# Patient Record
Sex: MALE
[De-identification: names, ages, dates, MRNs, and addresses within clinical notes are randomized per-mention and may not be internally consistent; named-entity substitution may affect disease eponyms.]

---

## 2019-12-18 ENCOUNTER — HOSPITAL ENCOUNTER (INPATIENT)
Dept: HOSPITAL 5 - ED | Age: 29
LOS: 1 days | Discharge: HOME | DRG: 871 | End: 2019-12-19
Attending: INTERNAL MEDICINE | Admitting: INTERNAL MEDICINE
Payer: COMMERCIAL

## 2019-12-18 DIAGNOSIS — J69.0: ICD-10-CM

## 2019-12-18 DIAGNOSIS — E87.1: ICD-10-CM

## 2019-12-18 DIAGNOSIS — Z71.6: ICD-10-CM

## 2019-12-18 DIAGNOSIS — R73.9: ICD-10-CM

## 2019-12-18 DIAGNOSIS — A41.9: Primary | ICD-10-CM

## 2019-12-18 DIAGNOSIS — E87.6: ICD-10-CM

## 2019-12-18 DIAGNOSIS — J96.01: ICD-10-CM

## 2019-12-18 DIAGNOSIS — F17.200: ICD-10-CM

## 2019-12-18 LAB
ALBUMIN SERPL-MCNC: 4.1 G/DL (ref 3.9–5)
ALT SERPL-CCNC: 60 UNITS/L (ref 7–56)
BASOPHILS # (AUTO): 0 K/MM3 (ref 0–0.1)
BASOPHILS NFR BLD AUTO: 0.2 % (ref 0–1.8)
BILIRUB UR QL STRIP: (no result)
BLOOD UR QL VISUAL: (no result)
BUN SERPL-MCNC: 16 MG/DL (ref 9–20)
BUN/CREAT SERPL: 18 %
CALCIUM SERPL-MCNC: 8.8 MG/DL (ref 8.4–10.2)
EOSINOPHIL # BLD AUTO: 0 K/MM3 (ref 0–0.4)
EOSINOPHIL NFR BLD AUTO: 0 % (ref 0–4.3)
HCT VFR BLD CALC: 41.4 % (ref 35.5–45.6)
HEMOLYSIS INDEX: 30
HGB BLD-MCNC: 14 GM/DL (ref 11.8–15.2)
HYALINE CASTS #/AREA URNS LPF: 1 /LPF
INR PPP: 1.02 (ref 0.87–1.13)
LYMPHOCYTES # BLD AUTO: 0.9 K/MM3 (ref 1.2–5.4)
LYMPHOCYTES NFR BLD AUTO: 9.1 % (ref 13.4–35)
MCHC RBC AUTO-ENTMCNC: 34 % (ref 32–34)
MCV RBC AUTO: 83 FL (ref 84–94)
MONOCYTES # (AUTO): 0.7 K/MM3 (ref 0–0.8)
MONOCYTES % (AUTO): 7.5 % (ref 0–7.3)
MUCOUS THREADS #/AREA URNS HPF: (no result) /HPF
PH UR STRIP: 6 [PH] (ref 5–7)
PLATELET # BLD: 125 K/MM3 (ref 140–440)
RBC # BLD AUTO: 4.97 M/MM3 (ref 3.65–5.03)
RBC #/AREA URNS HPF: 3 /HPF (ref 0–6)
UROBILINOGEN UR-MCNC: < 2 MG/DL (ref ?–2)
WBC #/AREA URNS HPF: 4 /HPF (ref 0–6)

## 2019-12-18 PROCEDURE — 87040 BLOOD CULTURE FOR BACTERIA: CPT

## 2019-12-18 PROCEDURE — 93010 ELECTROCARDIOGRAM REPORT: CPT

## 2019-12-18 PROCEDURE — 85025 COMPLETE CBC W/AUTO DIFF WBC: CPT

## 2019-12-18 PROCEDURE — 82805 BLOOD GASES W/O2 SATURATION: CPT

## 2019-12-18 PROCEDURE — 83036 HEMOGLOBIN GLYCOSYLATED A1C: CPT

## 2019-12-18 PROCEDURE — 36415 COLL VENOUS BLD VENIPUNCTURE: CPT

## 2019-12-18 PROCEDURE — 85027 COMPLETE CBC AUTOMATED: CPT

## 2019-12-18 PROCEDURE — 84145 PROCALCITONIN (PCT): CPT

## 2019-12-18 PROCEDURE — 4A033R1 MEASUREMENT OF ARTERIAL SATURATION, PERIPHERAL, PERCUTANEOUS APPROACH: ICD-10-PCS | Performed by: INTERNAL MEDICINE

## 2019-12-18 PROCEDURE — 82803 BLOOD GASES ANY COMBINATION: CPT

## 2019-12-18 PROCEDURE — 80074 ACUTE HEPATITIS PANEL: CPT

## 2019-12-18 PROCEDURE — 81001 URINALYSIS AUTO W/SCOPE: CPT

## 2019-12-18 PROCEDURE — 87400 INFLUENZA A/B EACH AG IA: CPT

## 2019-12-18 PROCEDURE — 82140 ASSAY OF AMMONIA: CPT

## 2019-12-18 PROCEDURE — 87086 URINE CULTURE/COLONY COUNT: CPT

## 2019-12-18 PROCEDURE — 71045 X-RAY EXAM CHEST 1 VIEW: CPT

## 2019-12-18 PROCEDURE — 71250 CT THORAX DX C-: CPT

## 2019-12-18 PROCEDURE — 85610 PROTHROMBIN TIME: CPT

## 2019-12-18 PROCEDURE — 80053 COMPREHEN METABOLIC PANEL: CPT

## 2019-12-18 PROCEDURE — 83735 ASSAY OF MAGNESIUM: CPT

## 2019-12-18 PROCEDURE — 93005 ELECTROCARDIOGRAM TRACING: CPT

## 2019-12-18 PROCEDURE — 80048 BASIC METABOLIC PNL TOTAL CA: CPT

## 2019-12-18 RX ADMIN — OSELTAMIVIR PHOSPHATE SCH MG: 75 CAPSULE ORAL at 22:30

## 2019-12-18 RX ADMIN — BENZONATATE SCH MG: 100 CAPSULE ORAL at 22:30

## 2019-12-18 RX ADMIN — SODIUM CHLORIDE SCH MLS/HR: 0.9 INJECTION, SOLUTION INTRAVENOUS at 18:05

## 2019-12-18 NOTE — HISTORY AND PHYSICAL REPORT
History of Present Illness


Date of admission: 


12/18/19 10:20





Chief complaint: 


Body aches


History of present illness: 


Patient is a 27 yo man with history of tobacco dependency who presents with 

severe worsening constant radiating body aches, subjective fevers, chills, 

yellow whitish productive cough and sore throat that begun on Saturday. He 

denies any aggravating or relieving factors. He decided to come to hospital 

because he was so exhausted and weak. His 11 month old baby girl was diagnosis 

with Influenza A. His Influenza was negative. He is being admitted to the 

hospital because his pO2 on ABG was only 58 on room air. 





PMH: as hpi


PSH: he denies


SH: +tob, no etoh/drug abuse


FH: he denies





ROS: 


Constitutional: + fever 


ENT: + throat or neck pain 


Respiratory: +cough, no shortness of breath 


Cardiovascular: denies: chest pain 


Endocrine: denies unexplained weight loss or gain 


Gastrointestinal: denies: abdominal pain, nausea +diarrhea


Genitourinary: denies: dysuria 


Rectal: denies no incontinence, no bleeding, no itching, no discharge 


Musculoskeletal: denies swelling, +myaglia, muscle weakness 


Skin: denies: rash 


Neurological: + headache 


Hematological/Lymphatic: denies: easy bleeding or easy bruising 


Allergic/Immunologic: no urticaria, no allergic rhinitis, no anaphylaxis 


Psych: denies sadness or hopelessness, SI/HI 





Medications and Allergies


                                    Allergies











Allergy/AdvReac Type Severity Reaction Status Date / Time


 


No Known Allergies Allergy   Unverified 12/18/19 05:47











                                Home Medications











 Medication  Instructions  Recorded  Confirmed  Last Taken  Type


 


No Known Home Medications [No  12/18/19 12/18/19 Unknown History





Reported Home Medications]     














Exam





- Physical Exam


Narrative exam: 


Gen: WDWN, ill appearing, mild increase accessory muscles, Awake, Alert, 

Orientated 


HEENT: NCAT, EOMI, PERRL, OP Clear 


Neck: supple, no adenopathy, no thyromegaly, no JVD 


CVS/Heart: Regular tachycardia normal S1S2, pulses present bilaterally 


Chest/Lungs: tachypneic, coarse bs, Symmetrical chest expansion, good air entry 

bilaterally 


GI/Abdomen: soft, NTND, good bowel sounds, no guarding or rebound 


/Bladder: no suprapubic tenderness, no CVA or paraspinal tenderness 


Extermity/Skin: no c/c/e, no obvious rash 


MSK: FROM x 4 


Neuro: CN 2-12 grossly intact, no new focal deficits 


Psych: calm 





- Constitutional


Vitals: 


                                        











Temp Pulse Resp BP Pulse Ox


 


 99.6 F   91 H  26 H  107/68   94 


 


 12/18/19 11:52  12/18/19 12:57  12/18/19 11:52  12/18/19 11:52  12/18/19 11:52














Results





- Labs


CBC & Chem 7: 


                                 12/18/19 05:57





                                 12/18/19 05:57


Labs: 


                             Laboratory Last Values











WBC  10.0 K/mm3 (4.5-11.0)   12/18/19  05:57    


 


RBC  4.97 M/mm3 (3.65-5.03)   12/18/19  05:57    


 


Hgb  14.0 gm/dl (11.8-15.2)   12/18/19  05:57    


 


Hct  41.4 % (35.5-45.6)   12/18/19  05:57    


 


MCV  83 fl (84-94)  L  12/18/19  05:57    


 


MCH  28 pg (28-32)   12/18/19  05:57    


 


MCHC  34 % (32-34)   12/18/19  05:57    


 


RDW  14.4 % (13.2-15.2)   12/18/19  05:57    


 


Plt Count  125 K/mm3 (140-440)  L  12/18/19  05:57    


 


Lymph % (Auto)  9.1 % (13.4-35.0)  L  12/18/19  05:57    


 


Mono % (Auto)  7.5 % (0.0-7.3)  H  12/18/19  05:57    


 


Eos % (Auto)  0.0 % (0.0-4.3)   12/18/19  05:57    


 


Baso % (Auto)  0.2 % (0.0-1.8)   12/18/19  05:57    


 


Lymph #  0.9 K/mm3 (1.2-5.4)  L  12/18/19  05:57    


 


Mono #  0.7 K/mm3 (0.0-0.8)   12/18/19  05:57    


 


Eos #  0.0 K/mm3 (0.0-0.4)   12/18/19  05:57    


 


Baso #  0.0 K/mm3 (0.0-0.1)   12/18/19  05:57    


 


Seg Neutrophils %  83.2 % (40.0-70.0)  H  12/18/19  05:57    


 


Seg Neutrophils #  8.4 K/mm3 (1.8-7.7)  H  12/18/19  05:57    


 


PT  13.5 Sec. (12.2-14.9)   12/18/19  05:57    


 


INR  1.02  (0.87-1.13)   12/18/19  05:57    


 


POC ABG pH  7.448  (7.35-7.45)   12/18/19  08:52    


 


POC ABG pCO2  32.2  (35-45)  L  12/18/19  08:52    


 


POC ABG pO2  58  ()  L  12/18/19  08:52    


 


POC ABG HCO3  22.3  (22-26 mml/L)   12/18/19  08:52    


 


POC ABG Total CO2  23  (23-27mmol/L)   12/18/19  08:52    


 


POC ABG O2 Sat  91   12/18/19  08:52    


 


POC ABG Base Excess  -2  ((-2) - (+3)mmol/L)   12/18/19  08:52    


 


VBG pH  7.425  (7.320-7.420)  H  12/18/19  05:57    


 


FiO2  21 %  12/18/19  08:52    


 


Sodium  136 mmol/L (137-145)  L  12/18/19  05:57    


 


Potassium  3.0 mmol/L (3.6-5.0)  L  12/18/19  05:57    


 


Chloride  101.1 mmol/L ()   12/18/19  05:57    


 


Carbon Dioxide  19 mmol/L (22-30)  L  12/18/19  05:57    


 


Anion Gap  19 mmol/L  12/18/19  05:57    


 


BUN  16 mg/dL (9-20)   12/18/19  05:57    


 


Creatinine  0.9 mg/dL (0.8-1.5)   12/18/19  05:57    


 


Estimated GFR  > 60 ml/min  12/18/19  05:57    


 


BUN/Creatinine Ratio  18 %  12/18/19  05:57    


 


Glucose  149 mg/dL ()  H  12/18/19  05:57    


 


Lactic Acid  1.50 mmol/L (0.7-2.0)   12/18/19  08:29    


 


Calcium  8.8 mg/dL (8.4-10.2)   12/18/19  05:57    


 


Total Bilirubin  0.30 mg/dL (0.1-1.2)   12/18/19  05:57    


 


AST  42 units/L (5-40)  H  12/18/19  05:57    


 


ALT  60 units/L (7-56)  H  12/18/19  05:57    


 


Alkaline Phosphatase  116 units/L ()   12/18/19  05:57    


 


Total Protein  7.8 g/dL (6.3-8.2)   12/18/19  05:57    


 


Albumin  4.1 g/dL (3.9-5)   12/18/19  05:57    


 


Albumin/Globulin Ratio  1.1 %  12/18/19  05:57    


 


Urine Color  Yellow  (Yellow)   12/18/19  07:58    


 


Urine Turbidity  Clear  (Clear)   12/18/19  07:58    


 


Urine pH  6.0  (5.0-7.0)   12/18/19  07:58    


 


Ur Specific Gravity  1.028  (1.003-1.030)   12/18/19  07:58    


 


Urine Protein  30 mg/dl mg/dL (Negative)   12/18/19  07:58    


 


Urine Glucose (UA)  Neg mg/dL (Negative)   12/18/19  07:58    


 


Urine Ketones  20 mg/dL (Negative)   12/18/19  07:58    


 


Urine Blood  Neg  (Negative)   12/18/19  07:58    


 


Urine Nitrite  Neg  (Negative)   12/18/19  07:58    


 


Urine Bilirubin  Neg  (Negative)   12/18/19  07:58    


 


Urine Urobilinogen  < 2.0 mg/dL (<2.0)   12/18/19  07:58    


 


Ur Leukocyte Esterase  Neg  (Negative)   12/18/19  07:58    


 


Urine WBC (Auto)  4.0 /HPF (0.0-6.0)   12/18/19  07:58    


 


Urine RBC (Auto)  3.0 /HPF (0.0-6.0)   12/18/19  07:58    


 


U Epithel Cells (Auto)  < 1.0 /HPF (0-13.0)   12/18/19  07:58    


 


Hyaline Casts  1 /LPF  12/18/19  07:58    


 


Urine Mucus  1+ /HPF  12/18/19  07:58    


 


Influenza A (Rapid)  Negative  (Negative)   12/18/19  05:54    


 


Influenza B (Rapid)  Negative  (Negative)   12/18/19  05:54    














Assessment and Plan


Assessment and plan: 


Patient is a 27 yo man with history of tobacco dependency who presents with 

severe worsening constant radiating body aches, subjective fevers, chills, 

yellow whitish productive cough and sore throat that begun on Saturday. He 

denies any aggravating or relieving factors. He decided to come to hospital 

because he was so exhausted and weak. His 11 month old baby girl was diagnosis 

with Influenza A. His Influenza was negative. He is being admitted to the 

hospital because his pO2 on ABG was only 58 on room air. 





Acute hypoxic respiratory failure most likely due to Pneumonia: treat with 

supplemental O2


Suspect early pre-radiographic Pneumonia: treat with IV abx


Sepsis Pneumonia: order blood cultures, treat with IV abx


Hypokalemia: replete, recheck and check mag level


Hyponatremia, hypovolemia: treat with IVFs


Elevated AST/ALT, mild: check hepatitis panel, he can follow up outpatient for 

any imaging


Hyperglycemia, mild: check A1c


DVT ppx: sq lovenox

## 2019-12-18 NOTE — CAT SCAN REPORT
CT CHEST WITHOUT CONTRAST



INDICATION: fevers



CONTRAST: Without IV



COMPARISON: Portable chest x-ray this morning



All CT scans at this location are performed using CT dose reduction for ALARA by means of automated e
xposure control. 



FINDINGS: No significant axillary or chest wall abnormalities are seen. No mediastinal or hilar latrell
s are noted. Minimal bilateral pleural effusions are seen. Visualized portions of the upper abdomen s
how no significant abnormalities. No pneumothorax or pneumomediastinum are seen. No obvious endobronc
hial lesions are noted.



Motion artifact is seen in the lung fields making evaluation more difficult. In the upper portion of 
the lingula and ovoid density is seen which appears semisolid and measures 11 mm in length and 7 mm i
n transverse diameter. Possibly this is a nodule though may be slight patchy infiltrate. Possible teresa
undglass type nodule is seen peripherally in the lingula measuring 8 mm though again this may be slig
ht patchy infiltrate rather than a true nodule. There is some patchy infiltrate in the lower lobes bi
laterally, more on the left, which likely represents pneumonitis. No extensive dense areas of consoli
dation are seen.





IMPRESSION: 

1. Patchy infiltrate is seen in the lower lobes bilaterally, more on the left, which likely represent
s patchy acute pneumonitis.

2. What is probably similar but more isolated change is seen in the lingula. I cannot fully exclude t
he possibility of groundglass type semisolid nodularity but believe in this scenario patchy infiltrat
e is more likely. I would suggest follow-up however.



Signer Name: Ernesto Perea MD 

Signed: 12/18/2019 7:16 PM

 Workstation Name: [id number]

## 2019-12-18 NOTE — XRAY REPORT
CHEST 1 VIEW 



INDICATION / CLINICAL INFORMATION:

possible Sepsis. 



COMPARISON: 

None available.



FINDINGS:



SUPPORT DEVICES: None.

HEART / MEDIASTINUM: No significant abnormality. 

LUNGS / PLEURA: No significant pulmonary or pleural abnormality. No pneumothorax. 



ADDITIONAL FINDINGS: No significant additional findings.



IMPRESSION:

1. No significant change



Signer Name: Francisco Lorenz MD 

Signed: 12/18/2019 6:14 AM

 Workstation Name: [id number]

## 2019-12-18 NOTE — EMERGENCY DEPARTMENT REPORT
HPI





- General


Chief Complaint: Upper Respiratory Infection


Time Seen by Provider: 19 06:35





- HPI


HPI: 





Room 19








The patient is a 28-year-old male presenting with a chief complaint of body 

aches and fever.  Patient's symptoms began 2019 and they include chills, 

bodyaches sore throat cough.  Patient states she's had a cough productive of 

yellow sputum in addition to rhinorrhea.  Patient also complains of headache and

body aches.  There are sick contacts at home with one child diagnosed with 

influenza.  The patient gets his pain a score of 8/10.








Location: [See above]


Duration: [See above]


Quality: [See above]


Severity: [See above]


Timing: [See above]


Context: [See above]


Modifying factors: [See above]


Associated signs and symptoms: [see above]








ED Past Medical Hx





- Past Medical History


Previous Medical History?: No





- Surgical History


Past Surgical History?: No





- Family History


Family history: no significant





- Social History


Smoking Status: Current Some Day Smoker (occasional)


Substance Use Type: None (denies illicit drug use)





- Medications


Home Medications: 


                                Home Medications











 Medication  Instructions  Recorded  Confirmed  Last Taken  Type


 


No Known Home Medications [No  19 Unknown History





Reported Home Medications]     














ED Review of Systems


ROS: 


Stated complaint: WEAKNESS/COUGH


Other details as noted in HPI





Constitutional: chills, fever


Eyes: denies: eye pain


ENT: congestion


Respiratory: cough, shortness of breath


Cardiovascular: denies: chest pain


Endocrine: no symptoms reported


Gastrointestinal: nausea, vomiting, diarrhea


Genitourinary: denies: dysuria


Musculoskeletal: myalgia


Neurological: headache





Physical Exam





- Physical Exam


Vital Signs: 


                                   Vital Signs











  19





  05:47 06:00 06:16


 


Temperature 103 F H  


 


Pulse Rate 117 H 59 L 93 H


 


Respiratory 20 26 H 20





Rate   


 


Blood Pressure 122/71 128/71 121/67


 


O2 Sat by Pulse 88  94





Oximetry   











Physical Exam: 





GENERAL: The patient is well-developed well-nourished male lying on stretcher.  

Be in moderate discomfort. []


HEENT: Normocephalic.  Atraumatic.  Extraocular motions are intact.  Patient has

 moist mucous membranes.


NECK: Supple.  No meningitic signs are noted.  There is no nuchal rigidity


CHEST/LUNGS: Clear to auscultation.  There is no respiratory distress noted.


HEART/CARDIOVASCULAR: Regular.  There is no tachycardia.  There is no gallop rub

 or murmur.


ABDOMEN: Abdomen is soft, nontender.  Patient has normal bowel sounds.  There is

 no abdominal distention.


SKIN: There is no rash.  There is no edema.  There is no diaphoresis.


NEURO: The patient is awake, alert, and oriented.  The patient is cooperative.  

The patient has no focal neurologic deficits.  The patient has normal speech


MUSCULOSKELETAL:  There is no evidence of acute injury.





ED Course


                                   Vital Signs











  19





  05:47 06:00 06:16


 


Temperature 103 F H  


 


Pulse Rate 117 H 59 L 93 H


 


Respiratory 20 26 H 20





Rate   


 


Blood Pressure 122/71 128/71 121/67


 


O2 Sat by Pulse 88  94





Oximetry   














ED Medical Decision Making





- Lab Data


Result diagrams: 


                                 19 05:57





                                 19 05:57





                                Laboratory Tests











  19





  05:54 05:57 05:57


 


WBC   10.0 


 


RBC   4.97 


 


Hgb   14.0 


 


Hct   41.4 


 


MCV   83 L 


 


MCH   28 


 


MCHC   34 


 


RDW   14.4 


 


Plt Count   125 L 


 


Lymph % (Auto)   9.1 L 


 


Mono % (Auto)   7.5 H 


 


Eos % (Auto)   0.0 


 


Baso % (Auto)   0.2 


 


Lymph #   0.9 L 


 


Mono #   0.7 


 


Eos #   0.0 


 


Baso #   0.0 


 


Seg Neutrophils %   83.2 H 


 


Seg Neutrophils #   8.4 H 


 


PT    13.5


 


INR    1.02


 


VBG pH   


 


Sodium   


 


Potassium   


 


Chloride   


 


Carbon Dioxide   


 


Anion Gap   


 


BUN   


 


Creatinine   


 


Estimated GFR   


 


BUN/Creatinine Ratio   


 


Glucose   


 


Lactic Acid   


 


Calcium   


 


Total Bilirubin   


 


AST   


 


ALT   


 


Alkaline Phosphatase   


 


Total Protein   


 


Albumin   


 


Albumin/Globulin Ratio   


 


Urine Color   


 


Urine Turbidity   


 


Urine pH   


 


Ur Specific Gravity   


 


Urine Protein   


 


Urine Glucose (UA)   


 


Urine Ketones   


 


Urine Blood   


 


Urine Nitrite   


 


Urine Bilirubin   


 


Urine Urobilinogen   


 


Ur Leukocyte Esterase   


 


Urine WBC (Auto)   


 


Urine RBC (Auto)   


 


U Epithel Cells (Auto)   


 


Hyaline Casts   


 


Urine Mucus   


 


Influenza A (Rapid)  Negative  


 


Influenza B (Rapid)  Negative  














  19





  05:57 05:57 05:57


 


WBC   


 


RBC   


 


Hgb   


 


Hct   


 


MCV   


 


MCH   


 


MCHC   


 


RDW   


 


Plt Count   


 


Lymph % (Auto)   


 


Mono % (Auto)   


 


Eos % (Auto)   


 


Baso % (Auto)   


 


Lymph #   


 


Mono #   


 


Eos #   


 


Baso #   


 


Seg Neutrophils %   


 


Seg Neutrophils #   


 


PT   


 


INR   


 


VBG pH    7.425 H


 


Sodium  136 L  


 


Potassium  3.0 L  


 


Chloride  101.1  


 


Carbon Dioxide  19 L  


 


Anion Gap  19  


 


BUN  16  


 


Creatinine  0.9  


 


Estimated GFR  > 60  


 


BUN/Creatinine Ratio  18  


 


Glucose  149 H  


 


Lactic Acid   1.60 


 


Calcium  8.8  


 


Total Bilirubin  0.30  


 


AST  42 H  


 


ALT  60 H  


 


Alkaline Phosphatase  116  


 


Total Protein  7.8  


 


Albumin  4.1  


 


Albumin/Globulin Ratio  1.1  


 


Urine Color   


 


Urine Turbidity   


 


Urine pH   


 


Ur Specific Gravity   


 


Urine Protein   


 


Urine Glucose (UA)   


 


Urine Ketones   


 


Urine Blood   


 


Urine Nitrite   


 


Urine Bilirubin   


 


Urine Urobilinogen   


 


Ur Leukocyte Esterase   


 


Urine WBC (Auto)   


 


Urine RBC (Auto)   


 


U Epithel Cells (Auto)   


 


Hyaline Casts   


 


Urine Mucus   


 


Influenza A (Rapid)   


 


Influenza B (Rapid)   














  19





  07:58


 


WBC 


 


RBC 


 


Hgb 


 


Hct 


 


MCV 


 


MCH 


 


MCHC 


 


RDW 


 


Plt Count 


 


Lymph % (Auto) 


 


Mono % (Auto) 


 


Eos % (Auto) 


 


Baso % (Auto) 


 


Lymph # 


 


Mono # 


 


Eos # 


 


Baso # 


 


Seg Neutrophils % 


 


Seg Neutrophils # 


 


PT 


 


INR 


 


VBG pH 


 


Sodium 


 


Potassium 


 


Chloride 


 


Carbon Dioxide 


 


Anion Gap 


 


BUN 


 


Creatinine 


 


Estimated GFR 


 


BUN/Creatinine Ratio 


 


Glucose 


 


Lactic Acid 


 


Calcium 


 


Total Bilirubin 


 


AST 


 


ALT 


 


Alkaline Phosphatase 


 


Total Protein 


 


Albumin 


 


Albumin/Globulin Ratio 


 


Urine Color  Yellow


 


Urine Turbidity  Clear


 


Urine pH  6.0


 


Ur Specific Gravity  1.028


 


Urine Protein  30 mg/dl


 


Urine Glucose (UA)  Neg


 


Urine Ketones  20


 


Urine Blood  Neg


 


Urine Nitrite  Neg


 


Urine Bilirubin  Neg


 


Urine Urobilinogen  < 2.0


 


Ur Leukocyte Esterase  Neg


 


Urine WBC (Auto)  4.0


 


Urine RBC (Auto)  3.0


 


U Epithel Cells (Auto)  < 1.0


 


Hyaline Casts  1


 


Urine Mucus  1+


 


Influenza A (Rapid) 


 


Influenza B (Rapid) 














- Radiology Data


Radiology results: report reviewed (chest x-ray), image reviewed (chest x-ray)


interpreted by me: 





Chest x-ray-no focal infiltrate, no pneumothorax





[address] [address] 

XRay Report Signed Patient: MYRIAM FOSS MR#: [id number] : 

1990 Acct:[id number] Age/Sex: 28 / M ADM Date: 19 Loc: ED 

Attending Dr: Ordering Physician: ED DOC, MD Date of Service: 19 

Procedure(s): XR chest 1V ap Accession Number(s): [id number] cc: ED DOC, MD Fluoro 

Time In Minutes: CHEST 1 VIEW INDICATION / CLINICAL INFORMATION: possible 

Sepsis. COMPARISON: None available. FINDINGS: SUPPORT DEVICES: None. HEART / 

MEDIASTINUM: No significant abnormality. LUNGS / PLEURA: No significant 

pulmonary or pleural abnormality. No pneumothorax. ADDITIONAL FINDINGS: No 

significant additional findings. IMPRESSION: 1. No significant change Signer 

Name: Francisco Lorenz MD Signed: 2019 6:14 AM Workstation Name: [id number]

 Transcribed By: ROXI Dictated By: Francisco Lorenz MD Electronically 

Authenticated By: Francisco Lorenz MD Signed Date/Time: 19 DD/DT: 

19 TD/TT: 











- Differential Diagnosis


influenza, pneumonia, URI


Critical care attestation.: 


If time is entered above; I have spent that time in minutes in the direct care 

of this critically ill patient, excluding procedure time.








ED Disposition


Clinical Impression: 


 Shortness of breath, Hypoxia, Cough, Body aches, Fever





Disposition: DC-09 OP ADMIT IP TO THIS HOSP


Is pt being admited?: Yes


Does the pt Need Aspirin: No


Condition: Fair


Time of Disposition: 09:03 (hospitalist paged)

## 2019-12-19 VITALS — SYSTOLIC BLOOD PRESSURE: 103 MMHG | DIASTOLIC BLOOD PRESSURE: 66 MMHG

## 2019-12-19 LAB
BUN SERPL-MCNC: 14 MG/DL (ref 9–20)
BUN/CREAT SERPL: 16 %
CALCIUM SERPL-MCNC: 8.5 MG/DL (ref 8.4–10.2)
HCT VFR BLD CALC: 39.5 % (ref 35.5–45.6)
HEMOLYSIS INDEX: 2
HGB BLD-MCNC: 13.3 GM/DL (ref 11.8–15.2)
MCHC RBC AUTO-ENTMCNC: 34 % (ref 32–34)
MCV RBC AUTO: 83 FL (ref 84–94)
PLATELET # BLD: 134 K/MM3 (ref 140–440)
RBC # BLD AUTO: 4.74 M/MM3 (ref 3.65–5.03)

## 2019-12-19 RX ADMIN — OSELTAMIVIR PHOSPHATE SCH MG: 75 CAPSULE ORAL at 10:00

## 2019-12-19 RX ADMIN — BENZONATATE SCH MG: 100 CAPSULE ORAL at 15:42

## 2019-12-19 RX ADMIN — BENZONATATE SCH MG: 100 CAPSULE ORAL at 06:29

## 2019-12-19 RX ADMIN — SODIUM CHLORIDE SCH MLS/HR: 0.9 INJECTION, SOLUTION INTRAVENOUS at 06:29

## 2019-12-19 NOTE — DISCHARGE SUMMARY
Providers





- Providers


Date of Admission: 


12/18/19 10:20





Date of discharge: 12/19/19


Attending physician: 


SAÚL ADAMES





                                        





12/18/19 18:02


Consult to Physician [CONS] Routine 


   Comment: 


   Consulting Provider: JAMAICA HUGHES


   Physician Instructions: 


   Reason For Exam: fevers











Primary care physician: 


PRIMARY CARE MD








Hospitalization


Condition: Stable


Hospital course: 


Patient is a 29 yo man with history of tobacco dependency who presents with 

severe worsening constant radiating body aches, subjective fevers, chills, 

yellow whitish productive cough and sore throat that begun on Saturday. He 

denies any aggravating or relieving factors. He decided to come to hospital 

because he was so exhausted and weak. His 11 month old baby girl was diagnosis 

with Influenza A. His Influenza was negative. He is being admitted to the 

hospital because his pO2 on ABG was only 58 on room air. Although pCXR was 

negative, his lung exam with not normal, so I ordered CT chest which showed 

bilateral infiltrates, suspected pneumonitis. 





Discharge Diagnoses:


Acute hypoxic respiratory failure most likely due to Pneumonia: treat with 

supplemental O2


Bilateral Aspiration Pneumonia/pneumonitis: treat with IV abx, tamiflu


Sepsis Pneumonia: order blood cultures, treat with IV abx


Hypokalemia: replete, recheck and checked mag level normal


Hyponatremia, hypovolemia: treat with IVFs, resolved


Elevated AST/ALT, mild: check hepatitis panel-negative, he can follow up 

outpatient for any imaging


Hyperglycemia, mild: check A1c is 5,8, most likely stress related to illness


Tobacco dependency:  on stopping. 


DVT ppx: sq lovenox





Disposition: DC-01 TO HOME OR SELFCARE


Time spent for discharge: 36 minutes





Core Measure Documentation





- Palliative Care


Palliative Care/ Comfort Measures: Not Applicable





- Core Measures


Any of the following diagnoses?: none





- VTE Discharge Requirements


Deep Vein Thrombosis/Pulmonary Embolism Present on Admission: No


Has pt received <5 days of overlap therapy or INR<2.0: No


Anticoagulant overlap therapy prescribed at discharge: No


Contraindication No Overlap Therapy order at DC: Not Indicated





Exam





- Physical Exam


Narrative exam: 


Gen: WDWN, ill appearing, mild increase accessory muscles, Awake, Alert, 

Orientated 


HEENT: NCAT, EOMI, PERRL, OP Clear 


Neck: supple, no adenopathy, no thyromegaly, no JVD 


CVS/Heart: Regular tachycardia normal S1S2, pulses present bilaterally 


Chest/Lungs: tachypneic, coarse bs, Symmetrical chest expansion, good air entry 

bilaterally 


GI/Abdomen: soft, NTND, good bowel sounds, no guarding or rebound 


/Bladder: no suprapubic tenderness, no CVA or paraspinal tenderness 


Extermity/Skin: no c/c/e, no obvious rash 


MSK: FROM x 4 


Neuro: CN 2-12 grossly intact, no new focal deficits 


Psych: calm 





- Constitutional


Vitals: 


                                        











Temp Pulse Resp BP Pulse Ox


 


 98.2 F   80   18   103/66   95 


 


 12/19/19 16:15  12/19/19 16:15  12/19/19 16:15  12/19/19 16:15  12/19/19 16:15














Plan


Activity: other (no strenous activity until cleared by Infectious Disease 

doctor)


Diet: regular


Special Instructions: smoking cessation


Follow up with: 


PRIMARY CARE,MD [Primary Care Provider] - 3-5 Days


JAMAICA HUGHES MD [Staff Physician] - 7 Days


Prescriptions: 


Azithromycin 500 mg PO QDAY #5 tablet


Oseltamivir [Tamiflu] 75 mg PO BID #8 capsule


Benzonatate [Tessalon Perles] 100 mg PO Q8HR PRN #15 capsule


 PRN Reason: Cough

## 2019-12-19 NOTE — CONSULTATION
History of Present Illness





- Reason for Consult


Consult date: 12/19/19





- History of Present Illness





29 yo M PMHx tobacco abuse admitted to the hospital with complaints of 

generalized myalgias, fevers, chills, and a cough productive of yellow sputum. 

He notes these symptoms began 4 days prior to admission and while some of them 

improved, he began to feel weakness as a result and that prompted his 

presentation to the hospital. In the ER his pO2 was found to be 58 which 

prompted his admission. Of note, his 11 month old daughter was recently 

diagnosed with influenza A. He notes that his SOB is the worst after a severe 

coughing spell, and that at home he had several episodes of vomiting after cough

paroxysms. 





Febrile to 101.2 with a white count of 6.3. Flu is negative. Blood cultures ne

gative, urine cultures negative. Currently receiving azithromycin, ceftriaxone, 

Tamiflu. 





Imaging personally reviewed:


Chest CT - bilateral patchy LL infiltrates - mostly likely acute pneumonitis. 





Review of systems:


 


Bold if positive; otherwise negative


GENERAL: fever, chills, weight loss, fatigue, night sweats


EYES: blurry vision, eye pain


HENT: headache, hearing loss, sore throat, dysphagia, sinus pain


CARDIO: chest pain, palpitations, orthopnea


PULM: shortness of breath, wheezing, cough, sputum, hemoptysis


GI: nausea, vomiting, diarrhea, abdominal pain, blood in stool


: urinary frequency, urgency, dysuria, urethral discharge


MSK: joint pain, back pain, swelling


SKIN: rash, redness


HEME: easy bruising, bleeding








Past History


Past Medical History: other (See HPI)


Past Surgical History: No surgical history


Social history: smoking


Family history: no significant family history





Medications and Allergies


                                    Allergies











Allergy/AdvReac Type Severity Reaction Status Date / Time


 


No Known Allergies Allergy   Unverified 12/18/19 05:47











                                Home Medications











 Medication  Instructions  Recorded  Confirmed  Last Taken  Type


 


No Known Home Medications [No  12/18/19 12/18/19 Unknown History





Reported Home Medications]     











Active Meds: 


Active Medications





Acetaminophen (Tylenol)  650 mg PO Q6H PRN


   PRN Reason: Non Cardiac Pain or Temp>100.5


   Last Admin: 12/18/19 18:05 Dose:  650 mg


   Documented by: 


Benzonatate (Tessalon Perles)  100 mg PO Q8HR [facility]


   Last Admin: 12/19/19 06:29 Dose:  100 mg


   Documented by: 


Enoxaparin Sodium (Enoxaparin)  40 mg SUB-Q QDAY@2200 [facility]


   Last Admin: 12/18/19 22:30 Dose:  40 mg


   Documented by: 


Azithromycin 500 mg/ Sodium (Chloride)  250 mls @ 250 mls/hr IV Q24HR [facility]; 

Protocol


   Last Admin: 12/19/19 11:04 Dose:  250 mls/hr


   Documented by: 


Ceftriaxone Sodium (Rocephin/Ns 2 Gm/100 Ml)  2 gm in 100 mls @ 200 mls/hr IV 

Q24HR [facility]; Protocol


   Last Admin: 12/19/19 10:00 Dose:  200 mls/hr


   Documented by: 


Sodium Chloride (Nacl 0.9% 1000 Ml)  1,000 mls @ 100 mls/hr IV AS DIRECT [facility]


   Last Admin: 12/19/19 06:29 Dose:  100 mls/hr


   Documented by: 


Ondansetron HCl (Zofran)  4 mg IV Q4H PRN


   PRN Reason: Nausea And Vomiting


Oseltamivir Phosphate (Tamiflu)  75 mg PO BID [facility]


   Stop: 12/23/19 10:01


   Last Admin: 12/19/19 10:00 Dose:  75 mg


   Documented by: 


Pantoprazole Sodium (Protonix)  40 mg PO QDAY [facility]


   Last Admin: 12/19/19 10:00 Dose:  40 mg


   Documented by: 


Potassium Chloride (K-Dur)  40 meq PO ONCE ONE


   Stop: 12/19/19 11:52


Pseudoephedrine/Acetam/Chlorphenir (Robitussin Ac)  10 ml PO Q4H PRN


   PRN Reason: Cough


   Last Admin: 12/18/19 18:05 Dose:  10 ml


   Documented by: 











Physical Examination





- Physical Exam


Narrative exam: 





General  Normal appearance, well developed, no acute distress


Eyes - PERRLA, EOM intact


ENT - Moist mucous membranes, no lymphadenopathy


Neck - No noticeable or palpable swelling, redness or rash around throat or on 

face


Lymph Nodes - No lymphadenopathy


Cardiovascular - RRR no m/r/g, no JVD, no carotid bruits


Lungs - Clear to auscultation, no use of accessory muscles, no crackles or 

wheezes.


Skin - No rashes, skin warm and dry, no erythematous areas


Abdomen - Normal bowel sounds, abdomen soft and nontender


Extremities - No edema, cyanosis or clubbing


Musculoskeletal - 5/5 strength, normal range of motion, no swollen or 

erythematous joints.


Neurological  Alert and oriented x 3, CN 2-12 grossly intact








- Constitutional


Vitals: 


                                   Vital Signs











Temp Pulse Resp BP Pulse Ox


 


 98.3 F   80   18   112/68   98 


 


 12/19/19 08:41  12/19/19 08:41  12/19/19 08:41  12/19/19 08:41  12/19/19 08:41








                           Temperature -Last 24 Hours











Temperature                    98.3 F


 


Temperature                    98.8 F


 


Temperature                    99.0 F


 


Temperature                    101.2 F


 


Temperature                    102.5 F


 


Temperature                    99.6 F


 


Temperature                    99.2 F

















Results





- Labs


CBC & Chem 7: 


                                 12/19/19 05:56





                                 12/19/19 05:56


Labs: 


                              Abnormal lab results











  12/19/19 12/19/19 Range/Units





  05:56 05:56 


 


MCV  83 L   (84-94)  fl


 


Plt Count  134 L   (140-440)  K/mm3


 


Potassium   3.4 L  (3.6-5.0)  mmol/L


 


Glucose   118 H  ()  mg/dL














Assessment and Plan





Cultures


Blood culture 12/18/19 no growth to date


Urine culture 12/18/19 no growth to date





Assessment: 29 yo M PMHx tobacco abuse admitted to the hospital with acute 

shortness of breath, likely due to a viral pneumonia. 





1. Pneumonitis - likely viral, though I am concerned for pertussis based on his 

symptoms and history (post-tussive vomiting, chiefly). I would complete a 

treatment course for pertussis with azithromycin. Despite flu negative, still 

possible as the test is quite technically difficult to collect. Would continue 

Tamiflu to complete treatment course as well. Ordered procal to ensure no 

bacterial component. 


2. Tobacco abuse - recommend cessation; ongoing smoking puts him more at risk of

further infections





Recs:


- continueTamiflu to complete 5 days (stop date: 12/23)


- continue azithromycin to complete 5 days (stop date 12/24)


- stopped ceftriaxone


- ordered procalcitonin


- ordered sputum culture


- recommend TDaP booster when fevers resolved


- ok for discharge from infectious disease perspective








Thank you for the consult, we will continue to follow. 





[facility] ([facility])


M: [phone]


O: [phone]


F: [phone]